# Patient Record
(demographics unavailable — no encounter records)

---

## 2025-05-21 NOTE — HISTORY OF PRESENT ILLNESS
[Coronary Artery Disease] : coronary artery disease [(Patient denies any chest pain, claudication, dyspnea on exertion, orthopnea, palpitations or syncope)] : Patient denies any chest pain, claudication, dyspnea on exertion, orthopnea, palpitations or syncope [Good (7-10 METs)] : Good (7-10 METs) [No Pertinent Pulmonary History] : no history of asthma, COPD, sleep apnea, or smoking [No Adverse Anesthesia Reaction] : no adverse anesthesia reaction in self or family member [Self] : previous adverse anesthesia reaction [Diabetes] : diabetes [Anti-Platelet Agents: _____] : Anti-Platelet Agents: [unfilled] [Aortic Stenosis] : no aortic stenosis [Atrial Fibrillation] : no atrial fibrillation [Recent Myocardial Infarction] : no recent myocardial infarction [Implantable Device/Pacemaker] : no implantable device/pacemaker [Asthma] : no asthma [COPD] : no COPD [Sleep Apnea] : no sleep apnea [Smoker] : not a smoker [Family Member] : no family member with adverse anesthesia reaction/sudden death [Chronic Anticoagulation] : no chronic anticoagulation [FreeTextEntry1] : dental extraction and implant [FreeTextEntry2] : 5/21/2025 [FreeTextEntry3] : Alexander Diaz in [place] [FreeTextEntry4] : 77 year y/o male with a PMHx of anxiety HTN HLD DM CAD s/p PCI/stent for preop medical evaluation. Patient feels well. No complaints. Denies chest pain, sob, mascorro, dizziness, orthopnea, diaphoresis, palpitations, LE swelling, syncope, n/v, headache. [FreeTextEntry7] : EKG reviewed: NSR with unchanged RBBB/LAFB, no acute ST-T wave changes TTE 3/2024: normal EF, grade 2 DD, dilated aorta 4.0cm cath 3/2024: patent diagonal stent otherwise unchanged nonobstructive CAD

## 2025-05-21 NOTE — HISTORY OF PRESENT ILLNESS
[Coronary Artery Disease] : coronary artery disease [(Patient denies any chest pain, claudication, dyspnea on exertion, orthopnea, palpitations or syncope)] : Patient denies any chest pain, claudication, dyspnea on exertion, orthopnea, palpitations or syncope [Good (7-10 METs)] : Good (7-10 METs) [No Pertinent Pulmonary History] : no history of asthma, COPD, sleep apnea, or smoking [No Adverse Anesthesia Reaction] : no adverse anesthesia reaction in self or family member [Self] : previous adverse anesthesia reaction [Diabetes] : diabetes [Anti-Platelet Agents: _____] : Anti-Platelet Agents: [unfilled] [Aortic Stenosis] : no aortic stenosis [Atrial Fibrillation] : no atrial fibrillation [Recent Myocardial Infarction] : no recent myocardial infarction [Implantable Device/Pacemaker] : no implantable device/pacemaker [Asthma] : no asthma [COPD] : no COPD [Sleep Apnea] : no sleep apnea [Smoker] : not a smoker [Family Member] : no family member with adverse anesthesia reaction/sudden death [Chronic Anticoagulation] : no chronic anticoagulation [FreeTextEntry1] : dental extraction and implant [FreeTextEntry2] : 5/21/2025 [FreeTextEntry3] : Alexander Diaz in [place] [FreeTextEntry4] : 77 year y/o male with a PMHx of anxiety HTN HLD DM CAD s/p PCI/stent for preop medical evaluation. Patient feels well. No complaints. Denies chest pain, sob, mascorro, dizziness, orthopnea, diaphoresis, palpitations, LE swelling, syncope, n/v, headache. [FreeTextEntry7] : EKG reviewed: NSR with unchanged RBBB/LAFB, no acute ST-T wave changes TTE 3/2024: normal EF, grade 2 DD, dilated aorta 4.0cm cath 3/2024: patent diagonal stent otherwise unchanged nonobstructive CAD

## 2025-05-29 NOTE — HISTORY OF PRESENT ILLNESS
Labs from Dr. Morton reviewed.  Notify patient to stop taking Macrobid.  Notify her to take ciprofloxacin.  A prescription was sent.  She is to take 1 tablet twice a day for 7 days.   [FreeTextEntry1] : 77 year old male with history of ulcerative proctitis/BPH/TN/T2DM on Jardiance Who presents for second opinion consultation for ongoing dysphagia to both liquids and solids for well over 10 years.  Patient has had 2 discrete episodes of aspiration pneumonia, 1 in March 2024 the second time more recently while they were in [place] 11/2024, but also had a PAC gastroenteritis at the same time with fevers, had 7 days of meropenem while at [facility].  He has had multiple endoscopies, results not immediately available for me for review.  Noted on his primary GIs brief note, esophageal biopsies negative.  Location not specified.  Patient had esophagram performed 5/2/2025.  Images reviewed personally by me, no evidence of tablet hanging up.  Contrast passes through the GE junction without impedance.  There is some mild tertiary contractions with intraesophageal reflux.  On review of systems, patient states that he will have ongoing dysphagia with feelings of food stuck in the retrosternal region approximately once a week.  1 time, he required his wife to hit him vigorously on his back and he cleared a bite of apple.  He denies any regurgitant symptoms or chest pain or weight loss.  He is maintained on dexlansoprazole 60 mg every night for Fluck symptoms which are well-controlled.  He does recall having some kind of speech and swallow test at [facility] at 1 point for his dysphagia symptoms but has never had Endoflip or esophageal manometry evaluation.   Medications are significant for 0.25 clonazepam every night for a REM sleep behavior disorder.  He is also on diltiazem as well as 12.5 mg of metoprolol.  Family history is significant for his father having worsening progressive dysphagia to the point that at the end of his life, he required a feeding tube.  He does not know his exact diagnosis or swallow disorder.   Barium: ACC: [id number] EXAM: XR ESOPH DBL CON STUDY ORDERED BY: NITESH DIAZ  PROCEDURE DATE: 05/02/2025    INTERPRETATION: CLINICAL INFORMATION: Dysphagia, sensation of food stuck in mid esophagus.  TECHNIQUE: A double contrast esophagram was performed under fluoroscopy utilizing barium as the contrast agent and multiple fluoroscopic spot and cine images were taken in AP, oblique and lateral projections.  FLUOROSCOPY TIME: 4.5 minutes.  COMPARISON: No similar examinations were available for comparison.  FINDINGS:  Preliminary  radiograph of the chest is unremarkable.  The patient swallowed barium without difficulty. The esophagus demonstrates normal distensibility, contour and course. Retention of contrast, retrograde flow and tertiary contractions consistent with esophageal dysmotility.. There is no abnormal extrinsic mass effect. Contrast passes freely through the gastroesophageal junction into a small hiatal hernia. Otherwise normal-appearing stomach. No gastroesophageal reflux was demonstrated during this examination. The patient swallowed a barium tablet without difficulty.   IMPRESSION:  Esophageal dysmotility. Small hiatal hernia. No significant gastroesophageal reflux was visualized.  --- End of Report ---

## 2025-05-29 NOTE — ASSESSMENT
[FreeTextEntry1] : 77-year-old male with ulcerative proctitis/HTN/T2DM/CAD status post PCI/BPH maintained on Jardiance/invoke/myalgia/clonazepam for REM behavioral sleep disorder here for second opinion consultation for longstanding dysphagia to both solids and liquids, more recently improved due to taking smaller bites of foods and chewing very slowly and swallowing and leaving time in between swallows.  Barium swallow reviewed by me, differential diagnosis includes spastic motility disorder but more likely ineffective esophageal motility.  Offered patient formal diagnostic testing with esophageal manometry.  Patient granddaughter is undergoing live donor liver transplantation at [facility] this month with great granddaughter as recipient due to biliary atresia, and they would like to hold off on scheduling procedure until after surgery.  In the interim, encourage patient to continue behavioral modifications such as small bites, chewing well, remaining upright.